# Patient Record
Sex: MALE | Race: OTHER
[De-identification: names, ages, dates, MRNs, and addresses within clinical notes are randomized per-mention and may not be internally consistent; named-entity substitution may affect disease eponyms.]

---

## 2020-11-01 ENCOUNTER — HOSPITAL ENCOUNTER (EMERGENCY)
Dept: HOSPITAL 54 - ER | Age: 25
Discharge: HOME | End: 2020-11-01
Payer: SELF-PAY

## 2020-11-01 VITALS — BODY MASS INDEX: 24.16 KG/M2 | HEIGHT: 65 IN | WEIGHT: 145 LBS

## 2020-11-01 VITALS — SYSTOLIC BLOOD PRESSURE: 110 MMHG | DIASTOLIC BLOOD PRESSURE: 78 MMHG

## 2020-11-01 DIAGNOSIS — F19.10: Primary | ICD-10-CM

## 2020-11-01 NOTE — NUR
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 PT 
ASLEEP, EASILY AWAKEN BY PAINFUL STIMULI & WILL GO BACK TO SLEEP. AWAITING EVAL 
BY CAROL. PLACED ON CARDIAC MONITOR, SR. ALICIA @  & CONT TO MONITOR.